# Patient Record
(demographics unavailable — no encounter records)

---

## 2024-10-30 NOTE — HISTORY OF PRESENT ILLNESS
[FreeTextEntry1] :  27 year old female presents for annual. Reports possible yeast infection or BV after recent intercourse. C/o some itchiness and irritation. Denies taking any OTC for relief. Notes doing well on OCP.  desires STI testing  no new PMSH

## 2024-10-30 NOTE — END OF VISIT
[FreeTextEntry3] : I, Nikki Rodríguez, acted as a scribe on behalf of Dr. Lesley Morris M.D. on 10/30/2024.   All medical entries made by the scribe were at my, Dr. Lesley Morris M.D., direction and personally dictated by me on 10/30/2024. I have reviewed the chart and agree that the record accurately reflects my personal performance of the history, physical exam, assessment and plan. I have also personally directed, reviewed, and agreed with the chart.

## 2024-10-30 NOTE — PLAN
[FreeTextEntry1] :  27 year old female presents for annual.  -Pap w. GC/CT -Vaginal Cx -STI screen -Rx Metrogel  F/u in 1 yr or if issue persists.  Lesley Morris MD

## 2024-10-30 NOTE — PHYSICAL EXAM
[Appropriately responsive] : appropriately responsive [Alert] : alert [No Acute Distress] : no acute distress [Soft] : soft [Non-tender] : non-tender [Non-distended] : non-distended [No HSM] : No HSM [No Lesions] : no lesions [No Mass] : no mass [Oriented x3] : oriented x3 [Examination Of The Breasts] : a normal appearance [No Masses] : no breast masses were palpable [Labia Majora] : normal [Labia Minora] : normal [Normal] : normal [Uterine Adnexae] : normal [Discharge] : a  ~M vaginal discharge was present [Moderate] : moderate [Foul Smelling] : foul smelling [White] : white